# Patient Record
Sex: MALE | Race: WHITE | Employment: FULL TIME | URBAN - METROPOLITAN AREA
[De-identification: names, ages, dates, MRNs, and addresses within clinical notes are randomized per-mention and may not be internally consistent; named-entity substitution may affect disease eponyms.]

---

## 2018-05-11 ENCOUNTER — APPOINTMENT (OUTPATIENT)
Dept: OCCUPATIONAL MEDICINE | Age: 63
End: 2018-05-11
Attending: PEDIATRICS

## 2018-05-15 ENCOUNTER — APPOINTMENT (OUTPATIENT)
Dept: PHYSICAL THERAPY | Age: 63
End: 2018-05-15
Attending: PEDIATRICS
Payer: OTHER MISCELLANEOUS

## 2018-05-15 ENCOUNTER — HOSPITAL ENCOUNTER (OUTPATIENT)
Dept: GENERAL RADIOLOGY | Age: 63
Discharge: HOME | End: 2018-05-15
Attending: FAMILY MEDICINE

## 2018-05-15 ENCOUNTER — OFFICE VISIT (OUTPATIENT)
Dept: OCCUPATIONAL MEDICINE | Age: 63
End: 2018-05-15
Attending: FAMILY MEDICINE

## 2018-05-15 DIAGNOSIS — M54.41 RIGHT-SIDED LOW BACK PAIN WITH RIGHT-SIDED SCIATICA, UNSPECIFIED CHRONICITY: Primary | ICD-10-CM

## 2018-05-15 PROCEDURE — 72110 X-RAY EXAM L-2 SPINE 4/>VWS: CPT | Performed by: FAMILY MEDICINE

## 2018-05-16 ENCOUNTER — OFFICE VISIT (OUTPATIENT)
Dept: PHYSICAL THERAPY | Age: 63
End: 2018-05-16
Attending: FAMILY MEDICINE
Payer: OTHER MISCELLANEOUS

## 2018-05-16 PROCEDURE — 97530 THERAPEUTIC ACTIVITIES: CPT | Performed by: PHYSICAL THERAPIST

## 2018-05-16 PROCEDURE — 97161 PT EVAL LOW COMPLEX 20 MIN: CPT | Performed by: PHYSICAL THERAPIST

## 2018-05-16 NOTE — PROGRESS NOTES
JOSE EVALUATION:   Referring Physician: Dr. Goldie Mccoy  Diagnosis: Lumbar Radiculopathy  M54.16  Date of Onset: 4/20/2018 Date of Service: 5/16/2018     PATIENT SUMMARY   Vinny Coe is a 66year old y/o male who presents to therapy today with complaint Walks independently without a device. Demonstrates decreased stance and push off on R LE. Today’s Treatment and Response:  Patient education provided on PT eval findings, treatment plan, goals, HEP.   Patient received today:  PT Eval Low Complexity,   Th care.      X___________________________________________________ Date____________________    Certification From: 6/16/2963  To:8/14/2018

## 2018-05-23 ENCOUNTER — OFFICE VISIT (OUTPATIENT)
Dept: PHYSICAL THERAPY | Age: 63
End: 2018-05-23
Attending: FAMILY MEDICINE
Payer: OTHER MISCELLANEOUS

## 2018-05-23 PROCEDURE — 97110 THERAPEUTIC EXERCISES: CPT | Performed by: PHYSICAL THERAPIST

## 2018-05-23 NOTE — PROGRESS NOTES
Diagnosis: Lumbar Radiculopathy  M54.16        Next MD visit: none scheduled  Fall Risk: standard         Precautions: n/a          Medication Changes since last visit?: No    Subjective: Still complains of back pain radiating to R calf.  States he has been

## 2018-05-30 ENCOUNTER — OFFICE VISIT (OUTPATIENT)
Dept: PHYSICAL THERAPY | Age: 63
End: 2018-05-30
Attending: FAMILY MEDICINE
Payer: OTHER MISCELLANEOUS

## 2018-05-30 PROCEDURE — 97110 THERAPEUTIC EXERCISES: CPT

## 2018-05-30 NOTE — PROGRESS NOTES
Diagnosis: Lumbar Radiculopathy  M54.16        Next MD visit: 6/13/18  Fall Risk: standard         Precautions: n/a          Medication Changes since last visit?: No    Subjective: Patient reports 2/10 B lower back pain today with R L/E radicular symptoms

## 2018-05-31 ENCOUNTER — APPOINTMENT (OUTPATIENT)
Dept: PHYSICAL THERAPY | Age: 63
End: 2018-05-31
Attending: PHYSICAL MEDICINE & REHABILITATION
Payer: OTHER MISCELLANEOUS

## 2018-06-01 ENCOUNTER — OFFICE VISIT (OUTPATIENT)
Dept: PHYSICAL THERAPY | Age: 63
End: 2018-06-01
Attending: INTERNAL MEDICINE
Payer: OTHER MISCELLANEOUS

## 2018-06-01 PROCEDURE — 97110 THERAPEUTIC EXERCISES: CPT

## 2018-06-01 NOTE — PROGRESS NOTES
Diagnosis: Lumbar Radiculopathy  M54.16        Next MD visit: 6/13/18  Fall Risk: standard         Precautions: n/a          Medication Changes since last visit?: No    Subjective: Patient reports 3/10 B lower back pain today with R L/E radicular symptoms

## 2018-06-13 ENCOUNTER — OFFICE VISIT (OUTPATIENT)
Dept: PHYSICAL THERAPY | Age: 63
End: 2018-06-13
Attending: FAMILY MEDICINE
Payer: OTHER MISCELLANEOUS

## 2018-06-13 PROCEDURE — 97110 THERAPEUTIC EXERCISES: CPT | Performed by: PHYSICAL THERAPIST

## 2018-06-13 PROCEDURE — 97014 ELECTRIC STIMULATION THERAPY: CPT | Performed by: PHYSICAL THERAPIST

## 2018-06-13 NOTE — PROGRESS NOTES
Diagnosis: Lumbar Radiculopathy  M54.16        Next MD visit: 6/13/18  Fall Risk: standard         Precautions: n/a          Medication Changes since last visit?: No    Subjective: Patient still complains of R LE radicular pain but is less frequent and les 4/5 and 5/5 throughout B LE's. Patient is independent with HEP. May benefit from follow up with MD due to persistent R LE radicular symptoms even with decreased intensity.     Objective:   Trunk ROM is minimally limited into extension and WFL into all other

## 2018-06-15 ENCOUNTER — APPOINTMENT (OUTPATIENT)
Dept: PHYSICAL THERAPY | Age: 63
End: 2018-06-15
Attending: FAMILY MEDICINE
Payer: OTHER MISCELLANEOUS

## 2018-06-25 ENCOUNTER — LAB ENCOUNTER (OUTPATIENT)
Dept: LAB | Age: 63
End: 2018-06-25
Attending: PHYSICAL MEDICINE & REHABILITATION
Payer: OTHER MISCELLANEOUS

## 2018-06-25 DIAGNOSIS — Z51.81 ENCOUNTER FOR THERAPEUTIC DRUG MONITORING: Primary | ICD-10-CM

## 2018-06-25 PROCEDURE — 85730 THROMBOPLASTIN TIME PARTIAL: CPT

## 2018-06-25 PROCEDURE — 36415 COLL VENOUS BLD VENIPUNCTURE: CPT

## 2018-06-25 PROCEDURE — 85610 PROTHROMBIN TIME: CPT

## 2018-06-28 ENCOUNTER — APPOINTMENT (OUTPATIENT)
Dept: PHYSICAL THERAPY | Age: 63
End: 2018-06-28
Attending: FAMILY MEDICINE
Payer: OTHER MISCELLANEOUS

## 2018-07-02 ENCOUNTER — APPOINTMENT (OUTPATIENT)
Dept: PHYSICAL THERAPY | Age: 63
End: 2018-07-02
Attending: FAMILY MEDICINE
Payer: OTHER MISCELLANEOUS

## 2018-07-05 ENCOUNTER — APPOINTMENT (OUTPATIENT)
Dept: PHYSICAL THERAPY | Age: 63
End: 2018-07-05
Attending: FAMILY MEDICINE
Payer: OTHER MISCELLANEOUS

## 2018-07-09 ENCOUNTER — APPOINTMENT (OUTPATIENT)
Dept: PHYSICAL THERAPY | Age: 63
End: 2018-07-09
Attending: FAMILY MEDICINE
Payer: OTHER MISCELLANEOUS

## (undated) NOTE — LETTER
Dear Dr. Jona Grace,    This letter is to inform you that Lizzie Verduzco has been attending Physical Therapy with me. See below for my most recent plan of care.         Physical Therapy Progress Report    Patient Name: NORI Osorio@, MRN: [id number] 3. Increase trunk ROM to [facility] into all planes to improve mobility. 4. Increase strength to 5/5 throughout to be able to resume previous activities include full duty at work.   5. Patient will verbalize and demonstrate strategies to improve posture during ac

## (undated) NOTE — LETTER
Patient Name: Niurka Clinton  YOB: 1955          MRN number:  [id number]  Date:  5/16/2018  Referring Physician: Dr. Ildefonso Whatley P.T. EVALUATION:   Referring Physician: Dr. Ainsley Aragon  Diagnosis: Lumbar Radiculopathy  M54.16  Date Sensation: WNL throughout    Special tests: (-) SLR    Posture: Some slouching during relaxed sitting. Corrects self easily when cued. Balance: WFL    Gait: Walks independently without a device. Demonstrates decreased stance and push off on R LE.     Pete Font [de-identified] certification required: Yes  I certify the need for these services furnished under this plan of treatment and while under my care.       X___________________________________________________ Date____________________    Certification From: 4/83/66